# Patient Record
Sex: MALE | Race: BLACK OR AFRICAN AMERICAN
[De-identification: names, ages, dates, MRNs, and addresses within clinical notes are randomized per-mention and may not be internally consistent; named-entity substitution may affect disease eponyms.]

---

## 2017-02-16 NOTE — PD
HPI


Chief Complaint:  ENT Complaint


Time Seen by Provider:  09:41


Travel History


International Travel<30 days:  No


Contact w/Intl Traveler<30days:  No


Traveled to known affect area:  No





History of Present Illness


HPI


31-year-old male, that is legally blind, presents to the emergency department 

with complaint of cough, nasal congestion, throat irritation times the last 3-4 

days.  Reports feeling short of breath and having chest tightness onset today.  

Denies chest pain.  Chest tightness is worse with coughing.  History of asthma.

  Denies history of DVT or PE.  Denies recent travel, hospitalization, trauma, 

surgery.  He is coughing up phlegm but doesn't know what color it is because he 

is blind.  Reports low-grade fever of 99.0 this morning.  Vomited one time this 

morning secondary to cough exacerbation.  Reports wheezing.  Others at the 

school are sick with similar symptoms.  Denies tobacco use.  Takes oral 

prednisone 5 mg daily for history of left kidney transplant.  History of 

hypertension and diabetes type II.  Does not take medications for diabetes at 

this time.  Allergies to morphine.  No other modifying factors or associated 

signs and symptoms.





PFSH


Past Medical History


Asthma:  Yes (childhood asthma )


Autoimmune Disease:  No


Heart Rhythm Problems:  No


Cancer:  No


Cardiovascular Problems:  Yes


High Cholesterol:  No


Chest Pain:  No


Congestive Heart Failure:  No


COPD:  No


Diabetes:  Yes (type 2)


Dialysis:  Yes (left arm fistula)


Endocrine:  Yes


Genitourinary:  Yes


Hypertension:  Yes


Immune Disorder:  No


Kidney Stones:  No


Musculoskeletal:  No


Neurologic:  No


Psychiatric:  No


Reproductive:  No


Respiratory:  Yes


Renal Failure:  No


Sleep Apnea:  No





Past Surgical History


Abdominal Surgery:  Yes


AICD:  No


Arteriovenous Shunt:  No


Body Medical Devices:  shunt r eye


Cardiac Surgery:  No


Ear Surgery:  No


Endocrine Surgery:  No


Eye Surgery:  Yes (shunt in the back of the right eye, lasic surgery bilat )


Genitourinary Surgery:  Yes


Insulin Pump:  No


Joint Replacement:  No


Oral Surgery:  No


Pacemaker:  No


Thoracic Surgery:  No


Other Surgery:  Yes (left kidney transplant)





Social History


Alcohol Use:  No


Tobacco Use:  No


Substance Use:  No





Allergies-Medications


(Allergen,Severity, Reaction):  


Coded Allergies:  


     Morphine (Verified  Allergy, Severe, 2/16/17)


 ALLERGY


Reported Meds & Prescriptions





Reported Meds & Active Scripts


Active








Review of Systems


Except as stated in HPI:  all other systems reviewed are Neg





Physical Exam


Narrative


GENERAL: Well-nourished, well-developed  male patient, in no 

acute distress; legally blind


SKIN: Warm and dry.


HEAD: Atraumatic. Normocephalic. 


EYES: 


ENT: Mucosa pink and moist. No erythema or exudates. No uvular edema. No uvular

, palatal, or tonsillar deviation.  Airway patent.  Nares without nasal blood, 

purulent drainage or septal hematoma.


EARS: Bilateral pinnae and external canals appear within normal limits. 

Bilateral tympanic membranes without erythema, dullness or perforation.


NECK: Trachea midline.  No lymphadenopathy. 


CARDIOVASCULAR: Regular rate and rhythm.  No murmur appreciated.   


RESPIRATORY: No accessory muscle use.  Lungs with Wheezing throughout to 

auscultation. Breath sounds equal bilaterally.  No retractions or tachypnea. 

Audible wheezing noted.  


GASTROINTESTINAL: Abdomen soft, non-tender, nondistended. Hepatic and splenic 

margins not palpable.  Bowel sounds are active 4 quadrants.  


MUSCULOSKELETAL: No obvious deformities. No clubbing.  No cyanosis.  No edema. 


NEUROLOGICAL: Awake and alert.  Oriented 3.  No obvious cranial nerve 

deficits.  Motor grossly within normal limits. Normal speech. Moves all 

extremities.  5/5 strength to all extremities.


PSYCHIATRIC: Appropriate mood and affect; insight and judgment normal.





Data


Data


Last Documented VS





Vital Signs








  Date Time  Temp Pulse Resp B/P Pulse Ox O2 Delivery O2 Flow Rate FiO2


 


2/16/17 08:53 98.4 86 24 116/59 96 Room Air  








Orders





 Influenzae A/B Antigen (2/16/17 09:36)


Chest, Single Ap (2/16/17 09:36)


Albuterol Neb (Albuterol Neb) (2/16/17 09:45)








MDM


Medical Decision Making


Medical Screen Exam Complete:  Yes


Emergency Medical Condition:  Yes


Medical Record Reviewed:  Yes


Differential Diagnosis


Bronchitis, viral illness, asthma exacerbation, less likely PE


Narrative Course


31-year-old male, that is legally blind, with cough and nasal congestion for 

the last 3-4 days.  He is afebrile on the ear.  Reports low-grade fever of 99.0 

this morning.  Patient is in no acute distress without retractions or 

tachypnea.  Lungs are with wheezing throughout and audible wheezing noted.  

Oxygen saturation is 96% on room air.  History of asthma.  Patient is 

complaining of shortness of breath and chest tightness it is worse with 

coughing.  The patient denies history of PE or DVT; denies recent surgery or 

trauma, hemoptysis and leg edema.  The patient has no present criteria for 

pulmonary embolism; using the PERC rule for pulmonary embolism there is no need 

for further workup for PE.  Albuterol nebulizer ordered.  Chest x-ray ordered.  

Influenza ordered.


1012:  Influenza negative.  Chest x-ray with no acute findings.


1015:  She reports improvement in symptoms after breathing treatment.  He 

denies chest tightness or shortness of breath at this time.  Lungs are clear 

and equal throughout.  No audible wheezing noted.  Azithromycin, pro-air inhaler

, Tessalon Perles, Nasonex nasal spray prescribed for home.  Patient verbalizes 

understanding and agreement with treatment plan.  Patient is medically cleared 

and stable for discharge.  Discussed reasons to return to the emergency 

department.  Instructed patient to follow up with primary care provider.  

Patient agrees with treatment plan.  The patients vital signs are stable and 

the patient is stable for outpatient follow-up and treatment.  Patient 

discharged home, stable and in no acute distress.





Diagnosis





 Primary Impression:  


 Acute bronchitis


 Qualified Code:  J20.9 - Acute bronchitis, unspecified organism


Referrals:  


Primary Care Physician


Patient Instructions:  Acute Bronchitis (ED), General Instructions


Departure Forms:  School Release,    Return to School Date:  Feb 20, 2017


   


   Tests/Procedures





***Additional Instructions:


Use Albuterol inhaler as prescribed


Take oral steroids as prescribed and complete full course


Use Tessalon Perles as prescribed to decrease coughing spasms


Over-the-counter decongestants or antihistamines as directed and as needed for 

symptom management


Your cough can last 4-6 weeks


Drink plenty of fluids to prevent dehydration


Use hot air humidifier to decrease cough exacerbation


Turn off ceiling fans and sleep with head of bed elevated


Avoid triggers such as second hand smoke, dust, known allergens


Follow-up with your primary care provider


Return to the emergency department immediately with worsening of symptoms


***Med/Other Pt SpecificInfo:  Prescription(s) given


Scripts


Mometasone Nasal Spray (Nasonex Nasal Spray)50 Mcg/Act Naspr2 [place] EACH NARE 

DAILY PRN (NASAL CONGESTION) #1 BOTTLE  Ref 0


   Prov:Noreen Downs ARNP         2/16/17 


Benzonatate (Tessalon Perles)100 Mg [id number] Mg PO TID PRN (COUGH) #20 CAP  Ref 0


   Prov:Noreen Downs ARNP         2/16/17 


Albuterol 8.5 GM Inh (Proair Hfa 8.5 GM Inh)90 Mcg/Act Aer2 Puff INH Q4-6H PRN (

SOB/WHEEZING) #1 INHALER  Ref 0


   108 mcg/actuation


   Prov:Noreen DownsP         2/16/17 


Azithromycin (Zithromax Z-Sujit)250 Mg [id number] Mg PO AS DIRECTED  #1 DSPK  Ref 0


   500 MG (2 tabs) day 1, then 1 tab days 2-5.


   Prov:Noreen Downs         2/16/17


Disposition:  01 DISCHARGE HOME


Condition:  Stable








Noreen Downs Feb 16, 2017 09:50

## 2017-02-16 NOTE — RADRPT
EXAM DATE/TIME:  02/16/2017 09:52 

 

[facility] COMPARISON:     

CHEST SINGLE AP, June 14, 2016, 11:59.

 

                     

INDICATIONS :     

Cough

                     

 

MEDICAL HISTORY :            

Diabetes mellitus type 2. Hypertension. Kidney transplant.   

 

SURGICAL HISTORY :     

None.   Right great toe amputation/kidney transplant.

 

ENCOUNTER:     

Initial                                        

 

ACUITY:     

2 days      

 

PAIN SCORE:     

0/10

 

LOCATION:     

Bilateral chest 

 

FINDINGS:     

A single view of the chest demonstrates the lungs to be symmetrically aerated without evidence of mas
s, infiltrate or effusion.  The cardiomediastinal contours are unremarkable.  Osseous structures are 
intact. A self-expanding stent is identified in the expected location of the left axillary vasculatur
e.

 

CONCLUSION:     

No acute cardiopulmonary process.

 

 

 

 Michael Reyes MD on February 16, 2017 at 10:03           

Board Certified Radiologist.

 This report was verified electronically.

## 2017-05-09 NOTE — RADRPT
EXAM DATE/TIME:  05/09/2017 08:38 

 

[facility] COMPARISON:     

No previous studies available for comparison.

 

 

INDICATIONS :     

Abdomen pain, nausea, vomiting and diarrhea since this morning. 

                  

 

ORAL CONTRAST:      

No oral contrast ingested.

                  

 

RADIATION DOSE:     

10.35 CTDIvol (mGy) 

 

 

MEDICAL HISTORY :     

Cardiovascular disease. Hypertension. Diabetes mellitus type 2.

 

SURGICAL HISTORY :       

Left Kidney and Pancreas transplant.

 

ENCOUNTER:      

Initial

 

ACUITY:      

1 day

 

PAIN SCALE:      

3/10

 

LOCATION:         

 

TECHNIQUE:     

Volumetric scanning of the abdomen and pelvis was performed.  Using automated exposure control and ad
justment of the mA and/or kV according to patient size, radiation dose was kept as low as reasonably 
achievable to obtain optimal diagnostic quality images. 

 

FINDINGS:     

 

LOWER LUNGS:     

Mild air space disease is identified posteriorly within the right lower lobe. Left lung is clear.

 

LIVER:     

The liver demonstrates a heterogeneous density but no discrete mass is worrisome biliary obstruction.
 Gallbladder is in place and is not distended.

 

SPLEEN:     

Normal size without lesion.

 

PANCREAS:     

Within normal limits. 

 

KIDNEYS:     

Both kidneys demonstrate generalized cortical thinning and are small. There are no suspicious renal m
asses or evidence of hydronephrosis.

     Transplanted kidney is identified in the left side of the pelvis.

 

ADRENAL GLANDS:     

Within normal limits.

 

VASCULAR:     

There is no aortic aneurysm.

 

BOWEL/MESENTERY:     

Postsurgical changes are identified in the right side of the pelvis and right lower quadrant of the a
bdomen. There appears to be focal scarring in the infra-cecal region without evidence of adjacent inf
lammatory bile disease or abnormal fluid collections. The intestinal tract is otherwise unremarkable

 

ABDOMINAL WALL:     

An umbilical hernia containing a loop of small bowel is identified. 

 

RETROPERITONEUM:     

There is no lymphadenopathy.

 

BLADDER:     

The bladder is distended with urine. There is slight deformity along its superolateral margins which 
appear postsurgical. No intrinsic abnormality was identified.

 

REPRODUCTIVE:     

Within normal limits.

 

INGUINAL:     

There is no lymphadenopathy or hernia.

 

MUSCULOSKELETAL:     

Within normal limits for patient age.

 

CONCLUSION:     

Transplanted kidney in the right side of the pelvis; no evidence of hydronephrosis or perinephric inf
lammation.

 

Atrophy of the native kidneys.

 

Bladder deformity and infra-cecal extraintestinal soft tissue changes characteristic of post surgical
 scarring. If the patient has not had any right lower quadrant or pelvic surgery then a desmoplastic 
reaction should be considered.

 

Small umbilical hernia containing a small bowel loop.

 

Otherwise no evidence of active inflammatory bowel disease.

 

Mild air space disease right lower lobe. 

 

 

 Nick Melgoza MD on May 09, 2017 at 9:11           

Board Certified Radiologist.

 This report was verified electronically.

## 2017-05-09 NOTE — PD
HPI


Chief Complaint:  GI Complaint


Time Seen by Provider:  07:08


Travel History


International Travel<30 days:  No


Contact w/Intl Traveler<30days:  No


Traveled to known affect area:  No





History of Present Illness


HPI


Patient is 32 year old male who presents to ER with c/o of abdominal pain, 

nausea and vomiting and diarrhea.  Patient reports that symptoms began this 

morning when he woke up, patient reports that he felt hot and cold, reports 

that he decided to take a shower and had diarrhea while in the shower.  patient 

reports that he has been vomiting, reports that he is unable to keep any fluids 

down today.  Patient reports that no one else in his house is sick with similar 

symptoms.  Patient reports abdominal pain.  Patient denies any fevers or 

chills.  He also complaining of right lower leg pain and cramping for the past 

3 days





PFSH


Past Medical History


Asthma:  Yes (childhood asthma )


Autoimmune Disease:  No


Heart Rhythm Problems:  No


Cancer:  No


Cardiovascular Problems:  Yes


High Cholesterol:  No


Chest Pain:  No


Congestive Heart Failure:  No


COPD:  No


Diabetes:  Yes (type 2)


Patient Takes Glucophage:  Yes


Dialysis:  Yes (left arm fistula)


Endocrine:  Yes


Gastrointestinal Disorders:  No


Genitourinary:  Yes


Hypertension:  Yes


Immune Disorder:  No


Implanted Vascular Access Dvce:  No


Kidney Stones:  No


Musculoskeletal:  No


Neurologic:  No


Psychiatric:  No


Reproductive:  No


Respiratory:  Yes


Renal Failure:  No


Sleep Apnea:  No





Past Surgical History


Abdominal Surgery:  Yes


AICD:  No


Arteriovenous Shunt:  No


Body Medical Devices:  shunt r eye


Cardiac Surgery:  No


Ear Surgery:  No


Endocrine Surgery:  No


Eye Surgery:  Yes (shunt in the back of the right eye, lasic surgery bilat )


Genitourinary Surgery:  Yes


Insulin Pump:  No


Joint Replacement:  No


Neurologic Surgery:  No


Oral Surgery:  No


Pacemaker:  No


Thoracic Surgery:  No


Other Surgery:  Yes (left kidney transplant)





Social History


Alcohol Use:  No


Tobacco Use:  No


Substance Use:  No





Allergies-Medications


(Allergen,Severity, Reaction):  


Coded Allergies:  


     Morphine (Verified  Allergy, Severe, 17)


 ALLERGY


Reported Meds & Prescriptions





Reported Meds & Active Scripts


Active


Reglan (Metoclopramide HCl) 10 Mg Tab 10 Mg PO TIDAC


Macrobid (Nitrofurantoin Monoh/Nitrofur Macro) 100 Mg Cap 100 Mg PO BID 10 Days


Nasonex Nasal Spray (Mometasone Furoate) 50 Mcg/Act Naspr 2 [place] EACH NARE 

DAILY PRN


Tessalon Perles (Benzonatate) 100 Mg Cap 100 Mg PO TID PRN


Proair Hfa 8.5 GM Inh (Albuterol Sulfate) 90 Mcg/Act Aer 2 Puff INH Q4-6H PRN


     108 mcg/actuation


Zithromax Z-Sujit (Azithromycin) 250 Mg Dspk 250 Mg PO AS DIRECTED


     500 MG (2 tabs) day 1, then 1 tab days 2-5.


Reported


[proggraf]   5 Mg  


Carvedilol 25 Mg Tab 25 Mg PO BID


Sodium Bicarbonate 650 Mg Tab 650 Mg PO TIDPC


Gabapentin 100 Mg Cap 100 Mg PO TID


Tacrolimus 1 Mg Cap 1 Mg PO Q12H








Review of Systems


General / Constitutional:  No: Fever


Eyes:  No: Visual changes


HENT:  No: Headaches


Cardiovascular:  No: Chest Pain or Discomfort


Respiratory:  No: Shortness of Breath


Gastrointestinal:  Positive: Nausea, Vomiting, Diarrhea, Abdominal Pain


Genitourinary:  No: Dysuria


Musculoskeletal:  Positive: Pain (lle pain and cramping)


Skin:  No Rash


Neurologic:  No: Weakness


Psychiatric:  No: Depression


Endocrine:  No: Polydipsia


Hematologic/Lymphatic:  No: Easy Bruising





Physical Exam


Narrative


GENERAL: Nad, nontoxic


SKIN: Focused skin assessment warm/dry.


HEAD: Atraumatic. Normocephalic. 


ENT: No nasal bleeding or discharge.  Mucous membranes pink and moist.


NECK: Trachea midline. No JVD. 


CARDIOVASCULAR: Regular rate and rhythm.  No murmur appreciated.


RESPIRATORY: No accessory muscle use. Clear to auscultation. Breath sounds 

equal bilaterally. 


GASTROINTESTINAL: Abdomen soft, increased tenderness near the umbilicus, no 

rebound or guarding on exam


MUSCULOSKELETAL: No obvious deformities. No clubbing.  No cyanosis.  Trace 

edema to the right lower extremity, pulses are normal


NEUROLOGICAL: Awake and alert. No obvious cranial nerve deficits.  Motor 

grossly within normal limits. Normal speech.


PSYCHIATRIC: Appropriate mood and affect; insight and judgment normal.





Data


Data


Last Documented VS





Vital Signs








  Date Time  Temp Pulse Resp B/P Pulse Ox O2 Delivery O2 Flow Rate FiO2


 


17 07:05   18     


 


17 06:57 99.0 106  177/58    








Orders





 Complete Blood Count With Diff (17 07:00)


Comprehensive Metabolic Panel (17 07:00)


Lipase (17 07:00)


Prothrombin Time / Inr (Pt) (17 07:00)


Act Partial Throm Time (Ptt) (17 07:00)


Urinalysis - C+S If Indicated (17 07:00)


Iv Access Insert/Monitor (17 07:00)


Ondansetron Inj (Zofran Inj) (17 07:00)


Sodium Chlor 0.9% 1000 Ml Inj (Ns 1000 M (17 07:00)


Sodium Chloride 0.9% Flush (Ns Flush) (17 07:00)


Famotidine Inj (Pepcid Inj) (17 07:00)


Us Leg Venous Doppler (17 )


Hydromorphone Pf Inj (Dilaudid Pf Inj) (17 07:15)


Metoclopramide Inj (Reglan Inj) (17 07:30)


Diphenhydramine (Benadryl) (17 07:45)


Ct Abd/Pel W/O Iv Contrast (17 )


Sodium Chlor 0.9% 1000 Ml Inj (Ns 1000 M (17 08:45)


Nitrofurantoin Monohyd Macrocr (Macrobid (17 10:00)


Urine Culture (17 09:49)





Labs





 Laboratory Tests








Test 17





 07:30 08:38


 


White Blood Count 9.8 TH/MM3 


 


Red Blood Count 5.21 MIL/MM3 


 


Hemoglobin 12.6 GM/DL 


 


Hematocrit 41.2 % 


 


Mean Corpuscular Volume 79.1 FL 


 


Mean Corpuscular Hemoglobin 24.2 PG 


 


Mean Corpuscular Hemoglobin 30.6 % 





Concent  


 


Red Cell Distribution Width 15.9 % 


 


Platelet Count 124 TH/MM3 


 


Mean Platelet Volume 8.1 FL 


 


Neutrophils (%) (Auto) 79.9 % 


 


Lymphocytes (%) (Auto) 11.2 % 


 


Monocytes (%) (Auto) 8.4 % 


 


Eosinophils (%) (Auto) 0.2 % 


 


Basophils (%) (Auto) 0.3 % 


 


Neutrophils # (Auto) 7.8 TH/MM3 


 


Lymphocytes # (Auto) 1.1 TH/MM3 


 


Monocytes # (Auto) 0.8 TH/MM3 


 


Eosinophils # (Auto) 0.0 TH/MM3 


 


Basophils # (Auto) 0.0 TH/MM3 


 


CBC Comment AUTO DIFF  


 


Differential Comment AUTO DIFF 





 CONFIRMED 


 


Platelet Estimate LOW  


 


Platelet Morphology Comment NORMAL  


 


Prothrombin Time 10.7 SEC 


 


Prothromb Time International 1.0 RATIO 





Ratio  


 


Activated Partial 27.9 SEC 





Thromboplast Time  


 


Sodium Level 140 MEQ/L 


 


Potassium Level 4.8 MEQ/L 


 


Chloride Level 112 MEQ/L 


 


Carbon Dioxide Level 19.2 MEQ/L 


 


Anion Gap 9 MEQ/L 


 


Blood Urea Nitrogen 19 MG/DL 


 


Creatinine 1.41 MG/DL 


 


Estimat Glomerular Filtration 70 ML/MIN 





Rate  


 


Random Glucose 118 MG/DL 


 


Calcium Level 9.0 MG/DL 


 


Total Bilirubin 0.5 MG/DL 


 


Aspartate Amino Transf 45 U/L 





(AST/SGOT)  


 


Alanine Aminotransferase 54 U/L 





(ALT/SGPT)  


 


Alkaline Phosphatase 67 U/L 


 


Total Protein 7.8 GM/DL 


 


Albumin 3.9 GM/DL 


 


Lipase 115 U/L 


 


Urine Color  YELLOW 


 


Urine Turbidity  CLEAR 


 


Urine pH  6.5 


 


Urine Specific Gravity  1.015 


 


Urine Protein  TRACE mg/dL


 


Urine Glucose (UA)  NEG mg/dL


 


Urine Ketones  NEG mg/dL


 


Urine Occult Blood  TRACE 


 


Urine Nitrite  NEG 


 


Urine Bilirubin  NEG 


 


Urine Urobilinogen  LESS THAN 2.0





  MG/DL


 


Urine Leukocyte Esterase  LARGE 


 


Urine RBC  5 /hpf


 


Urine WBC  8 /hpf


 


Urine Squamous Epithelial  <1 /hpf





Cells  


 


Urine Bacteria  RARE /hpf


 


Microscopic Urinalysis Comment  CULT NOT





  INDICATED











MDM


Medical Decision Making


Medical Screen Exam Complete:  Yes


Emergency Medical Condition:  Yes


Interpretation(s)





Vital Signs








  Date Time  Temp Pulse Resp B/P Pulse Ox O2 Delivery O2 Flow Rate FiO2


 


17 07:05   18     


 


17 06:57 99.0 106 18 177/58    








Differential Diagnosis


Gastroenteritis, gastritis, electrolyte abnormality, appendicitis, 

diverticulitis, colitis, right lower extremity pain and cramping could be 

secondary to DVT.


Narrative Course


Patient is a 32-year-old male who presents to emergency room with complaints of 

abdominal pain, nausea vomiting and diarrhea.  Patient reports the abdominal 

symptoms began this morning when he woke up, also reports that he has been 

having cramping to his right lower extremity for the past 3 days.  Patient 

uncomfortable on exam with tenderness to his umbilicus.  Plan to hydrate 

patient with IV fluids, will give antiemetics, labs as well as CT the abdomen 

and pelvis ordered.





Patient does report an allergy to morphine, reports that "I almost  when 

they give me morphine."  Patient requesting only Dilaudid for pain as this is 

the "only medication that I am allowed to have."  I reviewed my concerns with 

patient over administration of Dilaudid for pain if morphine caused him to 

"almost die" and patient assured me that he has tolerated Dilaudid in the past 

and is the only medicine that works for his pain.  A 0.5mg dose of medication 

ordered.





Patient also with pain to his right lower extremity, plan to obtain ultrasound 

of leg to rule out DVT.








Laboratory Tests








Test 17





 07:30 08:38


 


White Blood Count 9.8 TH/MM3 





 (4.0-11.0) 


 


Red Blood Count 5.21 MIL/MM3 





 (4.50-5.90) 


 


Hemoglobin 12.6 GM/DL 





 (13.0-17.0) 


 


Hematocrit 41.2 % 





 (39.0-51.0) 


 


Mean Corpuscular Volume 79.1 FL 





 (80.0-100.0) 


 


Mean Corpuscular Hemoglobin 24.2 PG 





 (27.0-34.0) 


 


Mean Corpuscular Hemoglobin 30.6 % 





Concent (32.0-36.0) 


 


Red Cell Distribution Width 15.9 % 





 (11.6-17.2) 


 


Platelet Count 124 TH/MM3 





 (150-450) 


 


Mean Platelet Volume 8.1 FL 





 (7.0-11.0) 


 


Neutrophils (%) (Auto) 79.9 % 





 (16.0-70.0) 


 


Lymphocytes (%) (Auto) 11.2 % 





 (9.0-44.0) 


 


Monocytes (%) (Auto) 8.4 % (0.0-8.0) 


 


Eosinophils (%) (Auto) 0.2 % (0.0-4.0) 


 


Basophils (%) (Auto) 0.3 % (0.0-2.0) 


 


Neutrophils # (Auto) 7.8 TH/MM3 





 (1.8-7.7) 


 


Lymphocytes # (Auto) 1.1 TH/MM3 





 (1.0-4.8) 


 


Monocytes # (Auto) 0.8 TH/MM3 





 (0-0.9) 


 


Eosinophils # (Auto) 0.0 TH/MM3 





 (0-0.4) 


 


Basophils # (Auto) 0.0 TH/MM3 





 (0-0.2) 


 


CBC Comment AUTO DIFF  


 


Differential Comment AUTO DIFF 





 CONFIRMED 


 


Platelet Estimate LOW  (NORMAL) 


 


Platelet Morphology Comment NORMAL 





 (NORMAL) 


 


Prothrombin Time 10.7 SEC 





 (9.8-11.6) 


 


Prothromb Time International 1.0 RATIO 





Ratio  


 


Activated Partial 27.9 SEC 





Thromboplast Time (24.3-30.1) 


 


Sodium Level 140 MEQ/L 





 (136-145) 


 


Potassium Level 4.8 MEQ/L 





 (3.5-5.1) 


 


Chloride Level 112 MEQ/L 





 () 


 


Carbon Dioxide Level 19.2 MEQ/L 





 (21.0-32.0) 


 


Anion Gap 9 MEQ/L (5-15) 


 


Blood Urea Nitrogen 19 MG/DL (7-18) 


 


Creatinine 1.41 MG/DL 





 (0.60-1.30) 


 


Estimat Glomerular Filtration 70 ML/MIN (>89) 





Rate  


 


Random Glucose 118 MG/DL 





 () 


 


Calcium Level 9.0 MG/DL 





 (8.5-10.1) 


 


Total Bilirubin 0.5 MG/DL 





 (0.2-1.0) 


 


Aspartate Amino Transf 45 U/L (15-37) 





(AST/SGOT)  


 


Alanine Aminotransferase 54 U/L (12-78) 





(ALT/SGPT)  


 


Alkaline Phosphatase 67 U/L () 


 


Total Protein 7.8 GM/DL 





 (6.4-8.2) 


 


Albumin 3.9 GM/DL 





 (3.4-5.0) 


 


Lipase 115 U/L 





 () 


 


Urine Color  YELLOW





  (YELLW/STRAW)


 


Urine Turbidity  CLEAR  (CLEAR)


 


Urine pH  6.5  (5.0-8.5)


 


Urine Specific Gravity  1.015





  (1.002-1.035)


 


Urine Protein  TRACE mg/dL





  (NEG-TRACE)


 


Urine Glucose (UA)  NEG mg/dL (NEG)


 


Urine Ketones  NEG mg/dL (NEG)


 


Urine Occult Blood  TRACE  (NEG)


 


Urine Nitrite  NEG  (NEG)


 


Urine Bilirubin  NEG  (NEG)


 


Urine Urobilinogen  LESS THAN 2.0





  MG/DL (LESS





  THAN 2.0)


 


Urine Leukocyte Esterase  LARGE  (NEG)


 


Urine RBC  5 /hpf (0-3)


 


Urine WBC  8 /hpf (0-5)


 


Urine Squamous Epithelial  <1 /hpf (0-5)





Cells  


 


Urine Bacteria  RARE /hpf





  (NONE)


 


Microscopic Urinalysis Comment  CULT NOT





  INDICATED








Last Impressions








Lower Extremity Ultrasound 17 0000 Signed





Impressions: 





 Service Date/Time:  Tuesday, May 9, 2017 07:45 - CONCLUSION:  No DVT in the 





 right leg. Lower extremity soft tissue swelling.     Josué Scherer MD 


 


Abdomen/Pelvis CT 17 0000 Signed





Impressions: 





 Service Date/Time:  Tuesday, May 9, 2017 08:38 - CONCLUSION:  Transplanted 





 kidney in the right side of the pelvis; no evidence of hydronephrosis or 





 perinephric inflammation.  Atrophy of the native kidneys.  Bladder deformity 

and 





 infra-cecal extraintestinal soft tissue changes characteristic of post 

surgical 





 scarring. If the patient has not had any right lower quadrant or pelvic 

surgery 





 then a desmoplastic reaction should be considered.  Small umbilical hernia 





 containing a small bowel loop.  Otherwise no evidence of active inflammatory 





 bowel disease.  Mild air space disease right lower lobe.     Nick Melgoza MD 





I reviewed all labs and all studies with patient in detail.  Patient reports 

that he feels better at this time, abdomen is soft, nontender, nondistended, no 

peritoneal signs.  Reviewed all labs and all studies in detail including all 

incidental findings.  Patient will follow-up with his primary care doctor and 

will return to emergency room as needed.  He understands need for repeat 

ultrasound of leg in one week if swelling persists.





Diagnosis





 Primary Impression:  


 Abdominal pain


 Qualified Code:  R10.84 - Generalized abdominal pain


 Additional Impressions:  


 Nausea & vomiting


 Qualified Code:  R11.2 - Nausea and vomiting, intractability of vomiting not 

specified, unspecified vomiting type


 UTI (urinary tract infection)


 Qualified Code:  N30.00 - Acute cystitis without hematuria


 Leg swelling


Patient Instructions:  General Instructions, Narcotic given in the ED





***Additional Instructions:


Please follow up with your primary care doctor in 2- 3 days





Return to the emergency room if symptoms worsen or progress





Return to the emergency room as needed





Please have a repeat ultrasound of your leg in one week if swelling persists





Please follow-up with all cultures from today


***Med/Other Pt SpecificInfo:  Prescription(s) given


Scripts


Metoclopramide (Reglan)10 Mg Tab10 Mg PO TIDAC  #20 TAB  Ref 0


   Prov:Brie Mathews DO         17 


Nitrofurantoin Monohydrate Macrocrystals (Macrobid)100 Mg Pli113 Mg PO BID  10 

Days  Ref 0


   Prov:Brie Mathews DO         17


Disposition:  01 DISCHARGE HOME


Condition:  Stable








Brie Mathews DO May 9, 2017 07:14

## 2017-05-09 NOTE — RADRPT
EXAM DATE/TIME:  05/09/2017 07:45 

 

[facility] COMPARISON:     

No previous studies available for comparison.

        

 

 

INDICATIONS :                

Right ankle pain and swelling. 

            

 

MEDICAL HISTORY :     

Diabetes mellitus type 2.      Asthma. HTN. MRSA. 

 

SURGICAL HISTORY :         

Shunt in the back of right eye. Bilateral lasik. Left AV fistula. Left kidney transplant. Right [place] partial amputation. Pancreas transplant. Blood transfusions. 

 

ENCOUNTER:     

Initial

 

ACUITY:     

1 day

 

PAIN SCORE:      

3/10

 

LOCATION:      

Right  leg.

            

                      

 

TECHNIQUE:     

Venous ultrasound of the leg was performed from the inguinal ligament to the proximal calf.  Real-anthony
e, color Doppler and spectral tracing, compression and augmentation techniques were used.  

 

FINDINGS:     

There is normal compressibility of the deep venous system from the inguinal region to the proximal ca
lf.  No echogenic clot is seen in the lumen of the common femoral, femoral, popliteal, and posterior 
tibial veins.  There is a normal response of the venous system to proximal and distal augmentation an
d respiration.  

 

CONCLUSION:     

No DVT in the right leg. Lower extremity soft tissue swelling.

 

 

 

 Josué Scherer MD on May 09, 2017 at 8:03           

Board Certified Radiologist.

 This report was verified electronically.

## 2018-03-23 ENCOUNTER — HOSPITAL ENCOUNTER (EMERGENCY)
Dept: HOSPITAL 17 - NEPK | Age: 33
Discharge: HOME | End: 2018-03-23
Payer: MEDICARE

## 2018-03-23 VITALS — WEIGHT: 234.79 LBS | HEIGHT: 72 IN | BODY MASS INDEX: 31.8 KG/M2

## 2018-03-23 VITALS
RESPIRATION RATE: 18 BRPM | DIASTOLIC BLOOD PRESSURE: 59 MMHG | HEART RATE: 98 BPM | TEMPERATURE: 98.5 F | OXYGEN SATURATION: 99 % | SYSTOLIC BLOOD PRESSURE: 133 MMHG

## 2018-03-23 VITALS — DIASTOLIC BLOOD PRESSURE: 84 MMHG | SYSTOLIC BLOOD PRESSURE: 132 MMHG

## 2018-03-23 DIAGNOSIS — Z94.0: ICD-10-CM

## 2018-03-23 DIAGNOSIS — Z94.83: ICD-10-CM

## 2018-03-23 DIAGNOSIS — I10: ICD-10-CM

## 2018-03-23 DIAGNOSIS — S93.401A: Primary | ICD-10-CM

## 2018-03-23 DIAGNOSIS — J45.909: ICD-10-CM

## 2018-03-23 DIAGNOSIS — E11.9: ICD-10-CM

## 2018-03-23 DIAGNOSIS — Z99.2: ICD-10-CM

## 2018-03-23 PROCEDURE — 99283 EMERGENCY DEPT VISIT LOW MDM: CPT

## 2018-03-23 PROCEDURE — L1906 AFO MULTILIG ANK SUP PRE OTS: HCPCS

## 2018-03-23 PROCEDURE — 73610 X-RAY EXAM OF ANKLE: CPT

## 2018-03-23 NOTE — RADRPT
EXAM DATE/TIME:  03/23/2018 19:23 

 

[facility] COMPARISON:     

No previous studies available for comparison.

 

                     

INDICATIONS :     

Right ankle pain after aptient rolled ankle today

                     

 

MEDICAL HISTORY :     

None.          

 

SURGICAL HISTORY :     

None.   

 

ENCOUNTER:     

Initial                                        

 

ACUITY:     

1 day      

 

PAIN SCORE:     

8/10

 

LOCATION:     

Right  entire ankle

 

FINDINGS:     

Three view exam was performed of the right ankle.  The bony structures are in normal alignment.  No e
vidence of fracture, dislocation.  The ankle mortise is intact.  No radiopaque foreign bodies are see
n.  Bony mineralization is normal.

 

CONCLUSION:     

1. Soft tissue swelling of the right ankle. No acute bony abnormality.

 

 

 

 Jose Cobb MD on March 23, 2018 at 20:21           

Board Certified Radiologist.

 This report was verified electronically.

## 2018-03-23 NOTE — PD
HPI


Chief Complaint:  Injury


Time Seen by Provider:  20:47


Travel History


International Travel<30 days:  No


Contact w/Intl Traveler<30days:  No


Traveled to known affect area:  No





History of Present Illness


HPI


32-year-old vision impaired black male with a history of kidney and pancreas 

transplant presents to emergency Department with complaints of right ankle 

pain.  He states that he had an inversion injury prior to arrival.  He did not 

hear a crack or pop.  He has history of chronic pedal edema and wears 

compression stockings.  [facility] is [facility] [employer].  Symptoms are mild 

to moderate.  No alleviating factors.  Exacerbated by stepping off a curb





[facility]


Past Medical History


*** Narrative Medical


Asthma, hypertension, history of diabetes resolved with kidney and pancreas 

transplant, vision impaired


Asthma:  Yes (childhood asthma )


Autoimmune Disease:  No


Heart Rhythm Problems:  No


Cancer:  No


Cardiovascular Problems:  Yes


High Cholesterol:  No


Chest Pain:  No


Congestive Heart Failure:  No


COPD:  No


Diabetes:  Yes (type 2)


Patient Takes Glucophage:  No


Dialysis:  Yes (left arm fistula)


Endocrine:  Yes


Gastrointestinal Disorders:  No


Genitourinary:  Yes


Hypertension:  Yes


Immune Disorder:  No


Implanted Vascular Access Dvce:  No


Kidney Stones:  No


Musculoskeletal:  No


Neurologic:  No


Psychiatric:  No


Reproductive:  No


Respiratory:  Yes


Renal Failure:  No


Sleep Apnea:  No





Past Surgical History


Abdominal Surgery:  Yes


AICD:  No


Arteriovenous Shunt:  No


Body Medical Devices:  shunt r eye


Cardiac Surgery:  No


Ear Surgery:  No


Endocrine Surgery:  No


Eye Surgery:  Yes (shunt in the back of the right eye, lasic surgery bilat )


Genitourinary Surgery:  Yes


Insulin Pump:  No


Joint Replacement:  No


Neurologic Surgery:  No


Oral Surgery:  No


Pacemaker:  No


Thoracic Surgery:  No


Other Surgery:  Yes (left kidney transplant)





Social History


Alcohol Use:  No


Tobacco Use:  No


Substance Use:  No





Allergies-Medications


(Allergen,Severity, Reaction):  


Coded Allergies:  


     morphine (Unverified  Allergy, Severe, 8/15/17)


 ALLERGY


Reported Meds & Prescriptions





Reported Meds & Active Scripts


Active


Reglan (Metoclopramide HCl) 10 Mg Tab 10 Mg PO TIDAC


Macrobid (Nitrofurantoin Monoh/Nitrofur Macro) 100 Mg Cap 100 Mg PO BID 10 Days


Nasonex Nasal Spray (Mometasone Furoate) 50 Mcg/Act Naspr 2 [place] EACH NARE 

DAILY PRN


Tessalon Perles (Benzonatate) 100 Mg Cap 100 Mg PO TID PRN


Proair Hfa 8.5 GM Inh (Albuterol Sulfate) 90 Mcg/Act Aer 2 Puff INH Q4-6H PRN


     108 mcg/actuation


Zithromax Z-Sujit (Azithromycin) 250 Mg Dspk 250 Mg PO AS DIRECTED


     500 MG (2 tabs) day 1, then 1 tab days 2-5.


Reported


[proggraf]   5 Mg  


Carvedilol 25 Mg Tab 25 Mg PO BID


Sodium Bicarbonate 650 Mg Tab 650 Mg PO TIDPC


Gabapentin 100 Mg Cap 100 Mg PO TID


Tacrolimus 1 Mg Cap 1 Mg PO Q12H








Review of Systems


Except as stated in HPI:  all other systems reviewed are Neg





Physical Exam


Narrative


GENERAL: This is a well-nourished, well-developed patient, in no apparent 

distress.


SKIN: No rashes, ecchymoses or lesions. Warm and dry.


HEAD: Atraumatic. Normocephalic.


EYES: Bilateral blindness


EARS: Clear


NOSE: Nasal turbinates appear normal.


THROAT: Mucosa pink and moist.  Airway patent.


NECK: Trachea midline. supple, moves head freely.


LUNGS: Clear to auscultation.


CV: Regular in rhythm.


ABDOMEN: Soft nontender.


EXT: No clubbing cyanosis chronic 2+ pedal edema.  Examination of the right 

lower extremity reveals pain to the anterior talar fibular posterior talar 

fibular ligament.  No pain over the medial lateral malleolus.  No instability.  

No pain in the forefoot, heel or calf.  History of prior amputation of the 

distal tip of the right great toe.





Data


Data


Last Documented VS





Vital Signs








  Date Time  Temp Pulse Resp B/P (MAP) Pulse Ox O2 Delivery O2 Flow Rate FiO2


 


3/23/18 18:51 98.5 98 18 133/59 (83) 99   








Orders





 Orders


Ankle, Complete ([id number]) (3/23/18 )


Ed Discharge Order (3/23/18 20:56)


Splint Or Brace Apply/Monitor (3/23/18 20:56)


Acetamin-Hydrocod 325-10 Mg (Norco 10-32 (3/23/18 21:00)








MDM


Medical Decision Making


Medical Screen Exam Complete:  Yes


Emergency Medical Condition:  Yes


Medical Record Reviewed:  Yes


Interpretation(s)


Right ankle: Positive soft tissue swelling.  No acute fracture or dislocation


Differential Diagnosis


MDM: High


Differential diagnoses: Fracture, sprain, strain, dislocation, contusion, 

neurovascular injury


Narrative Course


Patient has extremely high tolerance to pain medications.  He states that if he 

has any pain he has to take 10 mg of hydrocodone for any help.





X-ray of the right ankles negative for acute bony injury.  Patient's given 

Narco 10 mg by mouth.  He has compression stockings on.  He is given a Velcro 

ankle splint.  He is placed in a wheelchair and advised to be nonweightbearing 

for next 3 days.  Patient has a history of blindness and cannot operate 

crutches normally.  He is too high of a risk for fall.





Diagnosis





 Primary Impression:  


 right ankle sprain


Patient Instructions:  General Instructions, Narcotic given in the ED


Departure Forms:  School Release,       


   Please excuse from school until (free text option):  No work or school 3 

days.


Tests/Procedures





***Additional Instructions:  


Rest.


Elevation.


Ice packs for the next 3 days.


Velcro ankle splint


Limit weightbearing for the next week.


Tylenol for pain.


Follow-up with an orthopedist or your doctor in one week.


Return to the ER if any problems


***Med/Other Pt SpecificInfo:  No Meds Exist/No RX given


Disposition:  01 DISCHARGE HOME


Condition:  Stable











Jose Roberson Mar 23, 2018 20:54

## 2018-04-03 ENCOUNTER — HOSPITAL ENCOUNTER (EMERGENCY)
Dept: HOSPITAL 17 - NEPE | Age: 33
LOS: 1 days | Discharge: ANOTHER HOSPITAL | End: 2018-04-04
Payer: MEDICARE

## 2018-04-03 VITALS
TEMPERATURE: 98.6 F | HEART RATE: 98 BPM | OXYGEN SATURATION: 98 % | RESPIRATION RATE: 20 BRPM | DIASTOLIC BLOOD PRESSURE: 58 MMHG | SYSTOLIC BLOOD PRESSURE: 144 MMHG

## 2018-04-03 VITALS — HEIGHT: 72 IN | BODY MASS INDEX: 30.27 KG/M2 | WEIGHT: 223.46 LBS

## 2018-04-03 DIAGNOSIS — E11.9: ICD-10-CM

## 2018-04-03 DIAGNOSIS — I10: ICD-10-CM

## 2018-04-03 DIAGNOSIS — Z94.83: ICD-10-CM

## 2018-04-03 DIAGNOSIS — Z94.0: ICD-10-CM

## 2018-04-03 DIAGNOSIS — Z99.2: ICD-10-CM

## 2018-04-03 DIAGNOSIS — Z79.899: ICD-10-CM

## 2018-04-03 DIAGNOSIS — Z88.5: ICD-10-CM

## 2018-04-03 DIAGNOSIS — K86.3: Primary | ICD-10-CM

## 2018-04-03 PROCEDURE — 96376 TX/PRO/DX INJ SAME DRUG ADON: CPT

## 2018-04-03 PROCEDURE — 96375 TX/PRO/DX INJ NEW DRUG ADDON: CPT

## 2018-04-03 PROCEDURE — 96374 THER/PROPH/DIAG INJ IV PUSH: CPT

## 2018-04-03 PROCEDURE — 83690 ASSAY OF LIPASE: CPT

## 2018-04-03 PROCEDURE — 85025 COMPLETE CBC W/AUTO DIFF WBC: CPT

## 2018-04-03 PROCEDURE — 80195 ASSAY OF SIROLIMUS: CPT

## 2018-04-03 PROCEDURE — 86140 C-REACTIVE PROTEIN: CPT

## 2018-04-03 PROCEDURE — 85730 THROMBOPLASTIN TIME PARTIAL: CPT

## 2018-04-03 PROCEDURE — 85610 PROTHROMBIN TIME: CPT

## 2018-04-03 PROCEDURE — 93970 EXTREMITY STUDY: CPT

## 2018-04-03 PROCEDURE — 74176 CT ABD & PELVIS W/O CONTRAST: CPT

## 2018-04-03 PROCEDURE — 80197 ASSAY OF TACROLIMUS: CPT

## 2018-04-03 PROCEDURE — 99291 CRITICAL CARE FIRST HOUR: CPT

## 2018-04-03 PROCEDURE — 81001 URINALYSIS AUTO W/SCOPE: CPT

## 2018-04-03 PROCEDURE — 80053 COMPREHEN METABOLIC PANEL: CPT

## 2018-04-03 PROCEDURE — 85652 RBC SED RATE AUTOMATED: CPT

## 2018-04-04 VITALS
RESPIRATION RATE: 18 BRPM | DIASTOLIC BLOOD PRESSURE: 89 MMHG | HEART RATE: 96 BPM | OXYGEN SATURATION: 100 % | SYSTOLIC BLOOD PRESSURE: 160 MMHG

## 2018-04-04 VITALS
DIASTOLIC BLOOD PRESSURE: 72 MMHG | SYSTOLIC BLOOD PRESSURE: 175 MMHG | RESPIRATION RATE: 18 BRPM | HEART RATE: 93 BPM | OXYGEN SATURATION: 96 %

## 2018-04-04 VITALS
RESPIRATION RATE: 18 BRPM | DIASTOLIC BLOOD PRESSURE: 90 MMHG | OXYGEN SATURATION: 99 % | HEART RATE: 95 BPM | SYSTOLIC BLOOD PRESSURE: 194 MMHG | TEMPERATURE: 98.3 F

## 2018-04-04 LAB
ALBUMIN SERPL-MCNC: 3.6 GM/DL (ref 3.4–5)
ALP SERPL-CCNC: 84 U/L (ref 45–117)
ALT SERPL-CCNC: 29 U/L (ref 12–78)
AST SERPL-CCNC: 43 U/L (ref 15–37)
BASOPHILS # BLD AUTO: 0.1 TH/MM3 (ref 0–0.2)
BASOPHILS NFR BLD: 1.1 % (ref 0–2)
BILIRUB SERPL-MCNC: 0.4 MG/DL (ref 0.2–1)
BUN SERPL-MCNC: 19 MG/DL (ref 7–18)
CALCIUM SERPL-MCNC: 8.7 MG/DL (ref 8.5–10.1)
CHLORIDE SERPL-SCNC: 109 MEQ/L (ref 98–107)
COLOR UR: YELLOW
CREAT SERPL-MCNC: 1.32 MG/DL (ref 0.6–1.3)
CRP SERPL-MCNC: 4.87 MG/DL (ref 0–0.3)
EOSINOPHIL # BLD: 0 TH/MM3 (ref 0–0.4)
EOSINOPHIL NFR BLD: 0.2 % (ref 0–4)
ERYTHROCYTE [DISTWIDTH] IN BLOOD BY AUTOMATED COUNT: 16.1 % (ref 11.6–17.2)
GFR SERPLBLD BASED ON 1.73 SQ M-ARVRAT: 76 ML/MIN (ref 89–?)
GLUCOSE SERPL-MCNC: 79 MG/DL (ref 74–106)
GLUCOSE UR STRIP-MCNC: (no result) MG/DL
HCO3 BLD-SCNC: 24.3 MEQ/L (ref 21–32)
HCT VFR BLD CALC: 33.2 % (ref 39–51)
HGB BLD-MCNC: 10.9 GM/DL (ref 13–17)
HGB UR QL STRIP: (no result)
INR PPP: 1 RATIO
KETONES UR STRIP-MCNC: (no result) MG/DL
LYMPHOCYTES # BLD AUTO: 1.7 TH/MM3 (ref 1–4.8)
LYMPHOCYTES NFR BLD AUTO: 22.1 % (ref 9–44)
MCH RBC QN AUTO: 24.6 PG (ref 27–34)
MCHC RBC AUTO-ENTMCNC: 32.7 % (ref 32–36)
MCV RBC AUTO: 75.3 FL (ref 80–100)
MONOCYTE #: 1 TH/MM3 (ref 0–0.9)
MONOCYTES NFR BLD: 12.8 % (ref 0–8)
NEUTROPHILS # BLD AUTO: 4.9 TH/MM3 (ref 1.8–7.7)
NEUTROPHILS NFR BLD AUTO: 63.8 % (ref 16–70)
NITRITE UR QL STRIP: (no result)
PLATELET # BLD: 237 TH/MM3 (ref 150–450)
PMV BLD AUTO: 8.4 FL (ref 7–11)
PROT SERPL-MCNC: 8.1 GM/DL (ref 6.4–8.2)
PROTHROMBIN TIME: 9.9 SEC (ref 9.8–11.6)
RBC # BLD AUTO: 4.41 MIL/MM3 (ref 4.5–5.9)
SODIUM SERPL-SCNC: 141 MEQ/L (ref 136–145)
SP GR UR STRIP: 1.02 (ref 1–1.03)
URINE LEUKOCYTE ESTERASE: (no result)
WBC # BLD AUTO: 7.6 TH/MM3 (ref 4–11)

## 2018-04-04 NOTE — RADRPT
EXAM DATE/TIME:  04/04/2018 01:52 

 

[facility] COMPARISON:     

No previous studies available for comparison.

        

 

 

INDICATIONS :                

Bilateral leg swelling.

            

 

MEDICAL HISTORY :     

Hypertension.   Asthma. Dialysis. Renal disease. Diabetes. Blood transfusion.

 

SURGICAL HISTORY :      

Right eye surgery. Bilateral lasik surgery. Left arm AV fistula. Left kidney transplant. Right toe am
putation. Pancreas transplant.

 

ENCOUNTER:     

Subsequent

 

ACUITY:     

>1 year

 

PAIN SCORE:      

1/10

 

LOCATION:      

Bilateral  legs.

                       

 

TECHNIQUE:     

Venous ultrasound of the left and right leg was performed from the inguinal ligament to the proximal 
calf.  Real-time, color Doppler and spectral tracing, compression and augmentation techniques were us
ed.  

 

FINDINGS:     

 

RIGHT LEG:     

There is normal compressibility of the deep venous system from the inguinal region to the proximal ca
lf.  No echogenic clot is seen in the lumen of the common femoral, femoral, popliteal, and posterior 
tibial veins.  There is a normal response of the venous system to proximal and distal augmentation an
d respiration.  

 

LEFT LEG:     

There is normal compressibility of the deep venous system from the inguinal region to the proximal ca
lf.  No echogenic clot is seen in the lumen of the common femoral, femoral, popliteal, and posterior 
tibial veins.  There is a normal response of the venous system to proximal and distal augmentation an
d respiration.  

 

CONCLUSION:     

Normal examination.  

 

 

 

 Barry Rodriguez MD on April 04, 2018 at 2:25           

Board Certified Radiologist.

 This report was verified electronically.

## 2018-04-04 NOTE — RADRPT
EXAM DATE/TIME:  04/04/2018 00:29 

 

[facility] COMPARISON:     

CT ABDOMEN & PELVIS W/O CONTRAST, May 09, 2017, 8:38.

 

 

INDICATIONS :     

Abdomen pain; right side swelling.

                  

 

ORAL CONTRAST:      

No oral contrast ingested.

                  

 

RADIATION DOSE:     

9.1 CTDIvol (mGy) 

 

 

MEDICAL HISTORY :     

Cardiovascular disease. Hypertension. 

 

SURGICAL HISTORY :       

Kidney and pancreas transplant.

 

ENCOUNTER:      

Initial

 

ACUITY:      

2 days

 

PAIN SCALE:      

8/10

 

LOCATION:       

Bilateral  abdomen

 

TECHNIQUE:     

Volumetric scanning of the abdomen and pelvis was performed.  Using automated exposure control and ad
justment of the mA and/or kV according to patient size, radiation dose was kept as low as reasonably 
achievable to obtain optimal diagnostic quality images.  DICOM format image data is available electro
nically for review and comparison.  

 

FINDINGS:     

Presumed pancreas transplant is seen in the right lower quadrant in the pelvis with duodenal anastomo
sis potentially to the urinary bladder. There is slight indurated change extending cephalad generally
 interposed between the ascending colon and the psoas muscle with a small elongated low density colle
ction measuring approximately 4 cm at the level of the iliac crest which was not clearly present prev
iously and may be complication such as pseudocyst or abscess.

 

LOWER LUNGS:     

The visualized lower lungs are clear.

 

LIVER:     

Homogeneous density without lesion.  There is no dilation of the biliary tree.  No calcified gallston
es.

 

SPLEEN:     

Normal size without lesion.

 

PANCREAS:     

Within normal limits. 

 

KIDNEYS:     

Small native kidneys. Unremarkable transplant kidney in the left lower quadrant.

 

ADRENAL GLANDS:     

Within normal limits.

 

VASCULAR:     

There is no aortic aneurysm.

 

BOWEL/MESENTERY:     

The stomach, small bowel, and colon demonstrate no acute abnormality.  There is no free intraperitone
al air or fluid. 

 

ABDOMINAL WALL:     

Within normal limits.

 

RETROPERITONEUM:     

There is no lymphadenopathy.

 

BLADDER:     

No wall thickening or mass.

 

REPRODUCTIVE:     

Within normal limits.

 

INGUINAL:     

There is no lymphadenopathy or hernia.

 

MUSCULOSKELETAL:     

Within normal limits for patient age.

 

CONCLUSION:     

Right lower quadrant collection which may be complication associated with previous pancreas transplan
t such as pseudocyst or abscess. If there is to be consideration of sampling or drainage, the study w
ill need to be repeated with good attention to enteric contrast opacification of the entire bowel. 

 

 

 Barry Rodriguez MD on April 04, 2018 at 0:39           

Board Certified Radiologist.

 This report was verified electronically.

## 2018-04-04 NOTE — PD
HPI


Chief Complaint:  Abdominal Pain


Time Seen by Provider:  00:04


Travel History


International Travel<30 days:  No


Contact w/Intl Traveler<30days:  No


Traveled to known affect area:  No





History of Present Illness


HPI


Patient is a 32-year-old male presents emergency department for evaluation of 

right lower quadrant abdominal pain.  Patient is status post kidney transplant 

with graft on the left, pancreas transplant with his graft on the right.  

Patient states he was on dialysis in the past was looking up to receive a 

kidney transplant taking him off to dialysis.  His pancreas was transplanted 

because he has a history of diabetes according to him.  Both of these 

transplants were done in [facility].  Patient denies any fevers but states 

the pain is been going on for the past few days waxing and waning associate 

with some mild nausea without vomiting.  Worsening swelling of bilateral lower 

extremities worse on the right, he is also had some swelling in his hands which 

she states is chronic for him.  No chest pain no shortness of breath no 

diarrhea no constipation no blood in the stool.  He has been taking his 

antirejection meds as prescribed





PFS


Past Medical History


Asthma:  Yes (childhood asthma )


Autoimmune Disease:  No


Heart Rhythm Problems:  No


Cancer:  No


Cardiovascular Problems:  Yes


High Cholesterol:  No


Chest Pain:  No


Congestive Heart Failure:  No


COPD:  No


Diabetes:  Yes (type 2)


Dialysis:  Yes (left arm fistula)


Endocrine:  Yes


Gastrointestinal Disorders:  No


Genitourinary:  Yes


Hypertension:  Yes


Immune Disorder:  No


Implanted Vascular Access Dvce:  No


Kidney Stones:  No


Musculoskeletal:  No


Neurologic:  No


Psychiatric:  No


Reproductive:  No


Respiratory:  Yes


Renal Failure:  No


Sleep Apnea:  No





Past Surgical History


Abdominal Surgery:  Yes


AICD:  No


Arteriovenous Shunt:  No


Body Medical Devices:  shunt r eye


Cardiac Surgery:  No


Ear Surgery:  No


Endocrine Surgery:  No


Eye Surgery:  Yes (shunt in the back of the right eye, lasic surgery bilat )


Genitourinary Surgery:  Yes


Insulin Pump:  No


Joint Replacement:  No


Neurologic Surgery:  No


Oral Surgery:  No


Pacemaker:  No


Thoracic Surgery:  No


Other Surgery:  Yes (left kidney transplant)





Social History


Alcohol Use:  No


Tobacco Use:  No


Substance Use:  No





Allergies-Medications


(Allergen,Severity, Reaction):  


Coded Allergies:  


     morphine (Unverified  Allergy, Severe, 4/4/18)


 ALLERGY


Reported Meds & Prescriptions





Reported Meds & Active Scripts


Active


Reglan (Metoclopramide HCl) 10 Mg Tab 10 Mg PO TIDAC


Proair Hfa 8.5 GM Inh (Albuterol Sulfate) 90 Mcg/Act Aer 2 Puff INH Q4-6H PRN


     108 mcg/actuation


Reported


Prednisone 5 Mg Tab 5 Mg PO DAILY


Myfortic (Mycophenolate Sodium) 180 Mg Tab 90 Mg PO BID


Prograf (Tacrolimus) 5 Mg Cap 5 Mg PO BID


Carvedilol 25 Mg Tab 25 Mg PO BID


Gabapentin 100 Mg Cap 100 Mg PO TID


Tacrolimus 1 Mg Cap 1 Mg PO Q12H








Review of Systems


Except as stated in HPI:  all other systems reviewed are Neg





Physical Exam


Narrative


GENERAL: Well-developed, obese male in no obvious distress.


SKIN: Focused skin assessment warm/dry.


HEAD: Atraumatic. Normocephalic. 


EYES: Pupils equal and round. No scleral icterus. No injection or drainage. 


ENT: No nasal bleeding or discharge.  Mucous membranes pink and moist.


NECK: Trachea midline. No JVD. 


CARDIOVASCULAR: Regular rate and rhythm.  No murmur appreciated.


RESPIRATORY: No accessory muscle use. Clear to auscultation. Breath sounds 

equal bilaterally. 


GASTROINTESTINAL: Abdomen soft, fairly tender over the right lower quadrant, no 

rebound no percussive tenderness.  Psoas and obturator signs are negative., 

nondistended. Hepatic and splenic margins not palpable. 


MUSCULOSKELETAL: No obvious deformities. No clubbing.  No cyanosis.  

Significant pitting edema to bilateral lower extremities, there is also some 

edema of the hands bilaterally.


NEUROLOGICAL: Awake and alert. No obvious cranial nerve deficits.  Motor 

grossly within normal limits. Normal speech.


PSYCHIATRIC: Appropriate mood and affect; insight and judgment normal.





Data


Data


Last Documented VS





Vital Signs








  Date Time  Temp Pulse Resp B/P (MAP) Pulse Ox O2 Delivery O2 Flow Rate FiO2


 


4/4/18 01:31  93 18 175/72 (106) 96 Room Air  


 


4/4/18 00:15 98.3       








Orders





 Orders


Complete Blood Count With Diff (4/3/18 21:52)


Prothrombin Time / Inr (Pt) (4/3/18 21:52)


Act Partial Throm Time (Ptt) (4/3/18 21:52)


Urinalysis - C+S If Indicated (4/3/18 21:52)


Westergren Sedimentation Rate (4/4/18 00:02)


C-Reactive Protein (Crp) (4/4/18 00:02)


Ct Abd/Pel W/O Iv Contrast (4/4/18 )


Hydromorphone Pf Inj (Dilaudid Pf Inj) (4/4/18 00:30)


Diphenhydramine Inj (Benadryl Inj) (4/4/18 00:30)


Us Leg Venous Doppler Bilat (4/4/18 00:16)


Comprehensive Metabolic Panel (4/4/18 00:45)


Lipase (4/4/18 00:45)


Tacrolimus (Fk506) Prograf (4/4/18 01:13)


Sirolimus, Blood (4/4/18 01:13)





Labs





Laboratory Tests








Test


  4/4/18


00:45 4/4/18


01:02 4/4/18


01:22 4/4/18


01:49


 


White Blood Count 7.6 TH/MM3    


 


Red Blood Count 4.41 MIL/MM3    


 


Hemoglobin 10.9 GM/DL    


 


Hematocrit 33.2 %    


 


Mean Corpuscular Volume 75.3 FL    


 


Mean Corpuscular Hemoglobin 24.6 PG    


 


Mean Corpuscular Hemoglobin


Concent 32.7 % 


  


  


  


 


 


Red Cell Distribution Width 16.1 %    


 


Platelet Count 237 TH/MM3    


 


Mean Platelet Volume 8.4 FL    


 


Neutrophils (%) (Auto) 63.8 %    


 


Lymphocytes (%) (Auto) 22.1 %    


 


Monocytes (%) (Auto) 12.8 %    


 


Eosinophils (%) (Auto) 0.2 %    


 


Basophils (%) (Auto) 1.1 %    


 


Neutrophils # (Auto) 4.9 TH/MM3    


 


Lymphocytes # (Auto) 1.7 TH/MM3    


 


Monocytes # (Auto) 1.0 TH/MM3    


 


Eosinophils # (Auto) 0.0 TH/MM3    


 


Basophils # (Auto) 0.1 TH/MM3    


 


CBC Comment DIFF FINAL    


 


Differential Comment     


 


Erythrocyte Sedimentation Rate 43 mm/hr    


 


Blood Urea Nitrogen 19 MG/DL    


 


Creatinine 1.32 MG/DL    


 


Random Glucose 79 MG/DL    


 


Total Protein 8.1 GM/DL    


 


Albumin 3.6 GM/DL    


 


Calcium Level 8.7 MG/DL    


 


Alkaline Phosphatase 84 U/L    


 


Aspartate Amino Transf


(AST/SGOT) 43 U/L 


  


  


  


 


 


Alanine Aminotransferase


(ALT/SGPT) 29 U/L 


  


  


  


 


 


Total Bilirubin 0.4 MG/DL    


 


Sodium Level 141 MEQ/L    


 


Potassium Level 4.3 MEQ/L    


 


Chloride Level 109 MEQ/L    


 


Carbon Dioxide Level 24.3 MEQ/L    


 


Anion Gap 8 MEQ/L    


 


Estimat Glomerular Filtration


Rate 76 ML/MIN 


  


  


  


 


 


C-Reactive Protein 4.87 MG/DL    


 


Lipase 52 U/L    


 


Prothrombin Time  9.9 SEC   


 


Prothromb Time International


Ratio 


  1.0 RATIO 


  


  


 


 


Activated Partial


Thromboplast Time 


  29.9 SEC 


  


  


 


 


Urine Color    YELLOW 


 


Urine Turbidity    CLEAR 


 


Urine pH    5.5 


 


Urine Specific Gravity    1.020 


 


Urine Protein    NEG mg/dL 


 


Urine Glucose (UA)    NEG mg/dL 


 


Urine Ketones    NEG mg/dL 


 


Urine Occult Blood    NEG 


 


Urine Nitrite    NEG 


 


Urine Bilirubin    NEG 


 


Urine Urobilinogen


  


  


  


  LESS THAN 2.0


MG/DL


 


Urine Leukocyte Esterase    NEG 


 


Urine RBC    1 /hpf 


 


Urine WBC


  


  


  


  LESS THAN 1


/hpf


 


Microscopic Urinalysis Comment


  


  


  


  CULT NOT


INDICATED











MDM


Medical Decision Making


Medical Screen Exam Complete:  Yes


Emergency Medical Condition:  Yes


Differential Diagnosis


Pancreas rejection, appendicitis, gastritis, gastroenteritis, kidney stone,


Narrative Course


Patient roomed in the emergency department, significant tenderness of the right 

lower quadrant which is where he reports his pancreas graft is.  Patient CT of 

his abdomen was performed and shows the following results:


Last 24 hours Impressions








Lower Extremity Ultrasound 4/4/18 0016 Signed





Impressions: 





 Service Date/Time:  Wednesday, April 4, 2018 01:52 - CONCLUSION:  Normal 





 examination.       Barry Rodriguez MD 


 


Abdomen/Pelvis CT 4/4/18 0000 Signed





Impressions: 





 Service Date/Time:  Wednesday, April 4, 2018 00:29 - CONCLUSION:  Right lower 





 quadrant collection which may be complication associated with previous 

pancreas 





 transplant such as pseudocyst or abscess. If there is to be consideration of 





 sampling or drainage, the study will need to be repeated with good attention 

to 





 enteric contrast opacification of the entire bowel.     Barry Rodriguez MD 





Patient was initially discussed with Renata our transplant coordinator who 

states the patient certainly could be admitted to our hospital and seen by our 

transplant team, we discussed that maybe the patient would be best served going 

back to his transplant center which was [facility] in [facility].  

Ultimately patient was discussed with the hospitalist here who states that she 

thinks the patient would be best served by going to [place].  Patient was 

ultimately discussed with Leobardo Torres who is a physician on in the ER at 

[place] who accepted patient for ER to ER transfer.





We attempted make appropriate arrangements for transfer, the patient's been 

stabilized to the best of my ability, vital signs are stable he should be 

stable for the transport to [place].


Critical Care Narrative


Aggregate critical care time was 35 minutes. Time to perform other separately 

billable procedures was not  


included in the critical care time. My time did not include minutes spent 

treating any other patients simultaneously or on  


activities that did not directly contribute to the patient's treatment.  





The services I provided to this patient were to treat and/or prevent clinically 

significant deterioration that could result  


in: Death, disability, organ failure





I provided critical care services requiring my management, as noted below:


Chart data review, documentation time, medication orders and management, vital 

sign assessments/reviewing monitor data,  


ordering and reviewing lab tests, ordering and interpreting/reviewing x-rays 

and diagnostic studies, care of the patient  


and discussion of the patient with the admitting physicians.


Patient had to be discussed with multiple other providers including transfer 

center at [facility].





Diagnosis





 Primary Impression:  


 Pancreatic pseudocyst


 Additional Impression:  


 Inflammation of transplanted pancreas


Disposition:  70 TRANSFER TO OTHER FACILITY


Condition:  Stable











Дмитрий Garrido MD Apr 4, 2018 00:12